# Patient Record
Sex: MALE | Race: WHITE
[De-identification: names, ages, dates, MRNs, and addresses within clinical notes are randomized per-mention and may not be internally consistent; named-entity substitution may affect disease eponyms.]

---

## 2019-11-17 ENCOUNTER — HOSPITAL ENCOUNTER (EMERGENCY)
Dept: HOSPITAL 25 - UCEAST | Age: 54
Discharge: HOME | End: 2019-11-17
Payer: COMMERCIAL

## 2019-11-17 VITALS — DIASTOLIC BLOOD PRESSURE: 90 MMHG | SYSTOLIC BLOOD PRESSURE: 139 MMHG

## 2019-11-17 DIAGNOSIS — H11.32: Primary | ICD-10-CM

## 2019-11-17 PROCEDURE — G0463 HOSPITAL OUTPT CLINIC VISIT: HCPCS

## 2019-11-17 PROCEDURE — 99211 OFF/OP EST MAY X REQ PHY/QHP: CPT

## 2019-11-17 NOTE — UC
Eye Complaint HPI





- HPI Summary


HPI Summary: 





WOKE UP THIS MORNING WITH LEFT EYE REDNESS.  NO VISUAL DISTURBANCES, DRAINAGE, 

FOREIGN BODY SENSATION OR PAIN.  NO HEADACHE OR NAUSEA.  NO SENSITIVITY TO 

LIGHT. THINKS MAYBE HE FEELS SOME VAGUE "TIGHTNESS" AROUND HIS EYE. NO KNOWN 

TRAUMA.  DENIES REMEMBERING ANY VIGOROUS COUGHING OR SNEEZING BUT STATES SHE 

MAY HAVE RUBBED HIS EYES LAST NIGHT. HE DOES HAVE CONTACT LENSES BUT STATES HE 

HAS NOT WORN THEM IN OVER 3 WEEKS.





- History of Current Complaint


Chief Complaint: UCEye


Stated Complaint: EYE COMPLAINT


Time Seen by Provider: 11/17/19 09:20


Hx Obtained From: Patient


Onset/Duration: Sudden Onset, Lasting Hours, Still Present


Timing: Constant


Severity Initially: Mild


Severity Currently: Mild


Pain Intensity: 0


Pain Scale Used: 0-10 Numeric


Location of Injury: Conjunctiva


Aggravating Factor(s): Nothing


Alleviating Factor(s): Nothing


Associated Signs And Symptoms: Positive: Negative





- Allergies/Home Medications


Allergies/Adverse Reactions: 


 Allergies











Allergy/AdvReac Type Severity Reaction Status Date / Time


 


No Known Allergies Allergy   Verified 11/17/19 09:13














PMH/Surg Hx/FS Hx/Imm Hx


Cardiovascular History: Atrial Fibrillation


Cancer History: Prostate Cancer





- Surgical History


Surgical History: Yes


Surgery Procedure, Year, and Place: VASECTOMY.  inguinal HERNIA repair





- Family History


Known Family History: Positive: Non-Contributory





- Social History


Alcohol Use: Daily


Alcohol Amount: 1 beer/ day


Substance Use Type: None


Smoking Status (MU): Never Smoked Tobacco





Review of Systems


All Other Systems Reviewed And Are Negative: Yes


Constitutional: Positive: Negative


Skin: Positive: Negative


Eyes: Positive: Eye Redness.  Negative: Blurred Vision, Drainage, Photophobia


Respiratory: Positive: Negative


Cardiovascular: Positive: Negative


Gastrointestinal: Positive: Negative





Physical Exam


Triage Information Reviewed: Yes


Appearance: Well-Appearing, No Pain Distress, Well-Nourished


Vital Signs: 


 Initial Vital Signs











Temp  98.5 F   11/17/19 09:14


 


Pulse  73   11/17/19 09:14


 


Resp  16   11/17/19 09:14


 


BP  139/90   11/17/19 09:14


 


Pulse Ox  99   11/17/19 09:14











Vital Signs Reviewed: Yes


Eyes: Positive: Other: - LEFT EYE NASAL SCLERAL REDNESS, PERRL, EOMI.  Negative

: Discharge


ENT: Positive: Hearing grossly normal


Neck: Positive: Supple


Respiratory: Positive: No respiratory distress, No accessory muscle use


Cardiovascular: Positive: Pulses Normal


Abdomen Description: Positive: Soft


Musculoskeletal: Positive: No Edema


Neurological: Positive: Alert


Psychological: Positive: Age Appropriate Behavior


Skin: Negative: Rashes





Eye Complaint Course/Dx





- Course


Course Of Treatment: 





PATIENT'S PRESENTATION IS MOST CONSISTENT WITH A BENIGN SUBCONJUNCTIVAL 

HEMORRHAGE.  NO ACUTE INTERVENTION INDICATED TODAY.  INSTRUCTED NOT TO WEAR HIS 

CONTACT LENSES AT ALL UNTIL SYMPTOMS ARE COMPLETELY RESOLVED.  IF HE DEVELOPS 

ANY CONCERNING SYMPTOMS SUCH AS PAIN, FOREIGN BY SENSATION, VISUAL DISTURBANCES

, HEADACHE, NAUSEA, PHOTOPHOBIA HE IS TO FOLLOW-UP WITH AN EYE DOCTOR 

IMMEDIATELY.





- Differential Dx/Diagnosis


Provider Diagnosis: 


 Subconjunctival hemorrhage of left eye








Discharge ED





- Sign-Out/Discharge


Documenting (check all that apply): Patient Departure


All imaging exams completed and their final reports reviewed: No Studies





- Discharge Plan


Condition: Stable


Disposition: HOME


Patient Education Materials:  Subconjunctival Hemorrhage (ED)


Referrals: 


No Primary Care Phys,NOPCP [Primary Care Provider] - 


Additional Instructions: 


YOUR PRESENTATION IS MOST CONSISTENT WITH A BENIGN SUBCONJUNCTIVAL HEMORRHAGE.  

NO ACUTE INTERVENTION INDICATED AT PRESENT.  IF YOUR SYMPTOMS CHANGE OR DO NOT 

IMPROVE OVER THE NEXT FEW DAYS, OR IF YOU DEVELOP PAIN WITH EYE MOVEMENT, 

FOREIGN BODY SENSATION, PURULENT DRAINAGE, SENSITIVITY TO LIGHT, FEVER, VISUAL 

DISTURBANCE OR ANY OTHER CONCERNING SYMPTOMS FOLLOW-UP WITH YOUR EYE DOCTOR 

ASAP. NO CONTACT LENSES UNTIL YOUR SYMPTOMS ARE COMPLETELY RESOLVED.








- Billing Disposition and Condition


Condition: STABLE


Disposition: Home